# Patient Record
Sex: MALE | Race: WHITE | NOT HISPANIC OR LATINO | Employment: OTHER | URBAN - METROPOLITAN AREA
[De-identification: names, ages, dates, MRNs, and addresses within clinical notes are randomized per-mention and may not be internally consistent; named-entity substitution may affect disease eponyms.]

---

## 2019-06-20 ENCOUNTER — APPOINTMENT (OUTPATIENT)
Dept: NEUROLOGY | Facility: HOSPITAL | Age: 79
End: 2019-06-20

## 2019-06-20 ENCOUNTER — APPOINTMENT (OUTPATIENT)
Dept: CT IMAGING | Facility: HOSPITAL | Age: 79
End: 2019-06-20

## 2019-06-20 ENCOUNTER — APPOINTMENT (OUTPATIENT)
Dept: MRI IMAGING | Facility: HOSPITAL | Age: 79
End: 2019-06-20

## 2019-06-20 ENCOUNTER — HOSPITAL ENCOUNTER (OUTPATIENT)
Facility: HOSPITAL | Age: 79
Setting detail: OBSERVATION
Discharge: HOME | End: 2019-06-22
Attending: EMERGENCY MEDICINE | Admitting: HOSPITALIST

## 2019-06-20 DIAGNOSIS — R41.0 CONFUSION: Primary | ICD-10-CM

## 2019-06-20 PROBLEM — M19.90 ARTHRITIS: Status: ACTIVE | Noted: 2019-06-20

## 2019-06-20 PROBLEM — R41.3 AMNESTIC DISORDER: Status: ACTIVE | Noted: 2019-06-20

## 2019-06-20 PROBLEM — F01.50 VASCULAR DEMENTIA (HCC): Status: ACTIVE | Noted: 2019-06-20

## 2019-06-20 PROBLEM — F09 OBS (ORGANIC BRAIN SYNDROME): Status: ACTIVE | Noted: 2019-06-20

## 2019-06-20 LAB
ALBUMIN SERPL-MCNC: 4 G/DL (ref 3.5–5.2)
ALBUMIN/GLOB SERPL: 1.4 G/DL
ALP SERPL-CCNC: 57 U/L (ref 39–117)
ALT SERPL W P-5'-P-CCNC: 8 U/L (ref 1–41)
AMMONIA BLD-SCNC: 12 UMOL/L (ref 16–60)
AMPHET+METHAMPHET UR QL: NEGATIVE
ANION GAP SERPL CALCULATED.3IONS-SCNC: 10 MMOL/L
AST SERPL-CCNC: 15 U/L (ref 1–40)
BARBITURATES UR QL SCN: NEGATIVE
BASOPHILS # BLD AUTO: 0.08 10*3/MM3 (ref 0–0.2)
BASOPHILS NFR BLD AUTO: 1 % (ref 0–1.5)
BENZODIAZ UR QL SCN: NEGATIVE
BILIRUB SERPL-MCNC: 0.6 MG/DL (ref 0.2–1.2)
BILIRUB UR QL STRIP: NEGATIVE
BUN BLD-MCNC: 13 MG/DL (ref 8–23)
BUN/CREAT SERPL: 14.4 (ref 7–25)
CALCIUM SPEC-SCNC: 9.5 MG/DL (ref 8.6–10.5)
CANNABINOIDS SERPL QL: NEGATIVE
CHLORIDE SERPL-SCNC: 105 MMOL/L (ref 98–107)
CLARITY UR: CLEAR
CO2 SERPL-SCNC: 27 MMOL/L (ref 22–29)
COCAINE UR QL: NEGATIVE
COLOR UR: YELLOW
CREAT BLD-MCNC: 0.9 MG/DL (ref 0.76–1.27)
DEPRECATED RDW RBC AUTO: 46.3 FL (ref 37–54)
EOSINOPHIL # BLD AUTO: 0.57 10*3/MM3 (ref 0–0.4)
EOSINOPHIL NFR BLD AUTO: 6.9 % (ref 0.3–6.2)
ERYTHROCYTE [DISTWIDTH] IN BLOOD BY AUTOMATED COUNT: 14.3 % (ref 12.3–15.4)
ETHANOL BLD-MCNC: <10 MG/DL (ref 0–10)
ETHANOL UR QL: <0.01 %
FOLATE SERPL-MCNC: 13.9 NG/ML (ref 4.78–24.2)
GFR SERPL CREATININE-BSD FRML MDRD: 81 ML/MIN/1.73
GLOBULIN UR ELPH-MCNC: 2.8 GM/DL
GLUCOSE BLD-MCNC: 101 MG/DL (ref 65–99)
GLUCOSE UR STRIP-MCNC: NEGATIVE MG/DL
HCT VFR BLD AUTO: 43.7 % (ref 37.5–51)
HGB BLD-MCNC: 13.9 G/DL (ref 13–17.7)
HGB UR QL STRIP.AUTO: NEGATIVE
IMM GRANULOCYTES # BLD AUTO: 0.04 10*3/MM3 (ref 0–0.05)
IMM GRANULOCYTES NFR BLD AUTO: 0.5 % (ref 0–0.5)
KETONES UR QL STRIP: NEGATIVE
LEUKOCYTE ESTERASE UR QL STRIP.AUTO: NEGATIVE
LYMPHOCYTES # BLD AUTO: 1.54 10*3/MM3 (ref 0.7–3.1)
LYMPHOCYTES NFR BLD AUTO: 18.8 % (ref 19.6–45.3)
MCH RBC QN AUTO: 28.4 PG (ref 26.6–33)
MCHC RBC AUTO-ENTMCNC: 31.8 G/DL (ref 31.5–35.7)
MCV RBC AUTO: 89.2 FL (ref 79–97)
METHADONE UR QL SCN: NEGATIVE
MONOCYTES # BLD AUTO: 0.8 10*3/MM3 (ref 0.1–0.9)
MONOCYTES NFR BLD AUTO: 9.7 % (ref 5–12)
NEUTROPHILS # BLD AUTO: 5.18 10*3/MM3 (ref 1.7–7)
NEUTROPHILS NFR BLD AUTO: 63.1 % (ref 42.7–76)
NITRITE UR QL STRIP: NEGATIVE
NRBC BLD AUTO-RTO: 0 /100 WBC (ref 0–0.2)
OPIATES UR QL: NEGATIVE
OXYCODONE UR QL SCN: NEGATIVE
PH UR STRIP.AUTO: 6 [PH] (ref 5–8)
PLATELET # BLD AUTO: 261 10*3/MM3 (ref 140–450)
PMV BLD AUTO: 9.8 FL (ref 6–12)
POTASSIUM BLD-SCNC: 3.7 MMOL/L (ref 3.5–5.2)
PROT SERPL-MCNC: 6.8 G/DL (ref 6–8.5)
PROT UR QL STRIP: NEGATIVE
RBC # BLD AUTO: 4.9 10*6/MM3 (ref 4.14–5.8)
SODIUM BLD-SCNC: 142 MMOL/L (ref 136–145)
SP GR UR STRIP: 1.02 (ref 1–1.03)
TSH SERPL DL<=0.05 MIU/L-ACNC: 1.09 MIU/ML (ref 0.27–4.2)
UROBILINOGEN UR QL STRIP: NORMAL
VIT B12 BLD-MCNC: 295 PG/ML (ref 211–946)
WBC NRBC COR # BLD: 8.21 10*3/MM3 (ref 3.4–10.8)

## 2019-06-20 PROCEDURE — 80307 DRUG TEST PRSMV CHEM ANLYZR: CPT | Performed by: PHYSICIAN ASSISTANT

## 2019-06-20 PROCEDURE — 70450 CT HEAD/BRAIN W/O DYE: CPT

## 2019-06-20 PROCEDURE — G0378 HOSPITAL OBSERVATION PER HR: HCPCS

## 2019-06-20 PROCEDURE — 70553 MRI BRAIN STEM W/O & W/DYE: CPT

## 2019-06-20 PROCEDURE — A9577 INJ MULTIHANCE: HCPCS | Performed by: HOSPITALIST

## 2019-06-20 PROCEDURE — 82746 ASSAY OF FOLIC ACID SERUM: CPT | Performed by: NURSE PRACTITIONER

## 2019-06-20 PROCEDURE — 95816 EEG AWAKE AND DROWSY: CPT | Performed by: PSYCHIATRY & NEUROLOGY

## 2019-06-20 PROCEDURE — 80307 DRUG TEST PRSMV CHEM ANLYZR: CPT | Performed by: INTERNAL MEDICINE

## 2019-06-20 PROCEDURE — 99285 EMERGENCY DEPT VISIT HI MDM: CPT

## 2019-06-20 PROCEDURE — 36415 COLL VENOUS BLD VENIPUNCTURE: CPT | Performed by: INTERNAL MEDICINE

## 2019-06-20 PROCEDURE — 82607 VITAMIN B-12: CPT | Performed by: NURSE PRACTITIONER

## 2019-06-20 PROCEDURE — 81003 URINALYSIS AUTO W/O SCOPE: CPT | Performed by: PHYSICIAN ASSISTANT

## 2019-06-20 PROCEDURE — 84443 ASSAY THYROID STIM HORMONE: CPT | Performed by: NURSE PRACTITIONER

## 2019-06-20 PROCEDURE — 0 GADOBENATE DIMEGLUMINE 529 MG/ML SOLUTION: Performed by: HOSPITALIST

## 2019-06-20 PROCEDURE — 80053 COMPREHEN METABOLIC PANEL: CPT | Performed by: PHYSICIAN ASSISTANT

## 2019-06-20 PROCEDURE — 85025 COMPLETE CBC W/AUTO DIFF WBC: CPT | Performed by: PHYSICIAN ASSISTANT

## 2019-06-20 PROCEDURE — 95816 EEG AWAKE AND DROWSY: CPT

## 2019-06-20 PROCEDURE — 97161 PT EVAL LOW COMPLEX 20 MIN: CPT

## 2019-06-20 PROCEDURE — 99204 OFFICE O/P NEW MOD 45 MIN: CPT | Performed by: PSYCHIATRY & NEUROLOGY

## 2019-06-20 PROCEDURE — 82140 ASSAY OF AMMONIA: CPT | Performed by: NURSE PRACTITIONER

## 2019-06-20 RX ORDER — NITROGLYCERIN 0.4 MG/1
0.4 TABLET SUBLINGUAL
COMMUNITY
End: 2019-06-22 | Stop reason: HOSPADM

## 2019-06-20 RX ORDER — ONDANSETRON 4 MG/1
4 TABLET, FILM COATED ORAL EVERY 6 HOURS PRN
Status: DISCONTINUED | OUTPATIENT
Start: 2019-06-20 | End: 2019-06-22 | Stop reason: HOSPADM

## 2019-06-20 RX ORDER — ONDANSETRON 2 MG/ML
4 INJECTION INTRAMUSCULAR; INTRAVENOUS EVERY 6 HOURS PRN
Status: DISCONTINUED | OUTPATIENT
Start: 2019-06-20 | End: 2019-06-22 | Stop reason: HOSPADM

## 2019-06-20 RX ORDER — MELOXICAM 7.5 MG/1
7.5 TABLET ORAL DAILY
COMMUNITY
End: 2019-06-22 | Stop reason: HOSPADM

## 2019-06-20 RX ORDER — SODIUM CHLORIDE 0.9 % (FLUSH) 0.9 %
3 SYRINGE (ML) INJECTION EVERY 12 HOURS SCHEDULED
Status: DISCONTINUED | OUTPATIENT
Start: 2019-06-20 | End: 2019-06-22 | Stop reason: HOSPADM

## 2019-06-20 RX ORDER — SODIUM CHLORIDE 0.9 % (FLUSH) 0.9 %
1-10 SYRINGE (ML) INJECTION AS NEEDED
Status: DISCONTINUED | OUTPATIENT
Start: 2019-06-20 | End: 2019-06-22 | Stop reason: HOSPADM

## 2019-06-20 RX ORDER — ACETAMINOPHEN 650 MG/1
650 SUPPOSITORY RECTAL EVERY 4 HOURS PRN
Status: DISCONTINUED | OUTPATIENT
Start: 2019-06-20 | End: 2019-06-22 | Stop reason: HOSPADM

## 2019-06-20 RX ADMIN — SODIUM CHLORIDE, PRESERVATIVE FREE 3 ML: 5 INJECTION INTRAVENOUS at 20:54

## 2019-06-20 RX ADMIN — SODIUM CHLORIDE, PRESERVATIVE FREE 3 ML: 5 INJECTION INTRAVENOUS at 09:05

## 2019-06-20 RX ADMIN — GADOBENATE DIMEGLUMINE 15 ML: 529 INJECTION, SOLUTION INTRAVENOUS at 21:00

## 2019-06-20 RX ADMIN — SODIUM CHLORIDE 500 ML: 9 INJECTION, SOLUTION INTRAVENOUS at 03:36

## 2019-06-20 NOTE — PLAN OF CARE
Problem: Patient Care Overview  Goal: Plan of Care Review  Outcome: Ongoing (interventions implemented as appropriate)   06/20/19 9668   OTHER   Outcome Summary Pt with confusion and tangetal conversation. However, strength and balance appear to be near baseline as pt did not require assistance with bed mobility, transfers, or gait. PT will sign off at this time. RN aware. Please reconsult PT, if patient should experience a change in mobility.   Coping/Psychosocial   Plan of Care Reviewed With patient

## 2019-06-20 NOTE — NURSING NOTE
Called PCP and they are faxing the last couple of office noted.  reports that he was pretty with it at his last appointment on June 12th.

## 2019-06-20 NOTE — ED PROVIDER NOTES
Pt presents to the ED via EMS for evaluation of AMS. Pt was pulled over by PD tonight, and told police that he is from [place] and was driving to [place]. Pt reported that he left home on Monday and has been driving for 18 hours, and seemed confused, prompting PD to call EMS. On exam, Pt is resting comfortably, in no distress. Vital signs are stable. Plan for admission for further evaluation of AMS    Attestation:  The EVA and I have discussed this patient's history, physical exam, and treatment plan.  I have reviewed the documentation and personally had a face to face interaction with the patient. I affirm the documentation and agree with the treatment and plan.  The attached note describes my personal findings.      Documentation assistance provided by praveen Tellez for Dr Rivera Information recorded by the praveen was done at my direction and has been verified and validated by me.     Mady Tellez  06/20/19 0459       Juan Rivera MD  06/20/19 0535       Juan Rivera MD  06/20/19 0636

## 2019-06-20 NOTE — ED PROVIDER NOTES
" EMERGENCY DEPARTMENT ENCOUNTER    CHIEF COMPLAINT  Chief Complaint: Altered Mental Status  History given by: patient and EMS  History limited by: AMS  Room Number: 26/26  PMD: System, Provider Not In      HPI:  Pt is a 78 y.o. male who presents with AMS that was noticed tonight by EMS after he was pulled over by PD. When the PD found the pt, his car was parked in the middle of the road. Upon questioning the pt, they noted that the pt was markedly confused. Pt reports that he is initially from [place] and [place] [facility] [place] about [facility] days ago. He has driven \"about 18 hours\" all together. In his pockets he has receipts for multiple gas stations and a hotel key. Pt believes he is [place] at this time. He is currently unsure of why he is in the ED and denies any complaints.    PAST MEDICAL HISTORY  Active Ambulatory Problems     Diagnosis Date Noted   • No Active Ambulatory Problems     Resolved Ambulatory Problems     Diagnosis Date Noted   • No Resolved Ambulatory Problems     No Additional Past Medical History       PAST SURGICAL HISTORY  No past surgical history on file.    FAMILY HISTORY  No family history on file.    SOCIAL HISTORY  Social History     Socioeconomic History   • Marital status: Unknown     Spouse name: Not on file   • Number of children: Not on file   • Years of education: Not on file   • Highest education level: Not on file       ALLERGIES  Patient has no known allergies.    REVIEW OF SYSTEMS  Review of Systems   Unable to perform ROS: Mental status change       PHYSICAL EXAM  ED Triage Vitals [06/20/19 0229]   Temp Heart Rate Resp BP SpO2   -- 71 18 135/66 97 %      Temp src Heart Rate Source Patient Position BP Location FiO2 (%)   -- -- -- -- --       Physical Exam   Constitutional: No distress.   HENT:   Head: Normocephalic and atraumatic.   Eyes: EOM are normal. Pupils are equal, round, and reactive to light.   Neck: Normal range of motion. Neck supple.   Cardiovascular: Normal " rate, regular rhythm and normal heart sounds.   Pulmonary/Chest: Effort normal and breath sounds normal. No respiratory distress.   Abdominal: Soft. There is no tenderness. There is no rebound and no guarding.   Musculoskeletal: Normal range of motion. He exhibits no edema.   Neurological: He is alert. He has normal sensation and normal strength.   No focal neuro deficits.    Skin: Skin is warm and dry.   Nursing note and vitals reviewed.      LAB RESULTS  Lab Results (last 24 hours)     Procedure Component Value Units Date/Time    CBC & Differential [[id number]] Collected:  06/20/19 0333    Specimen:  Blood Updated:  06/20/19 0350    Narrative:       The following orders were created for panel order CBC & Differential.  Procedure                               Abnormality         Status                     ---------                               -----------         ------                     CBC Auto Differential[[id number]]        Abnormal            Final result                 Please view results for these tests on the individual orders.    Comprehensive Metabolic Panel [[id number]]  (Abnormal) Collected:  06/20/19 0333    Specimen:  Blood Updated:  06/20/19 0409     Glucose 101 mg/dL      BUN 13 mg/dL      Creatinine 0.90 mg/dL      Sodium 142 mmol/L      Potassium 3.7 mmol/L      Chloride 105 mmol/L      CO2 27.0 mmol/L      Calcium 9.5 mg/dL      Total Protein 6.8 g/dL      Albumin 4.00 g/dL      ALT (SGPT) 8 U/L      AST (SGOT) 15 U/L      Alkaline Phosphatase 57 U/L      Total Bilirubin 0.6 mg/dL      eGFR Non African Amer 81 mL/min/1.73      Globulin 2.8 gm/dL      A/G Ratio 1.4 g/dL      BUN/Creatinine Ratio 14.4     Anion Gap 10.0 mmol/L     Narrative:       GFR Normal >60  Chronic Kidney Disease <60  Kidney Failure <15    CBC Auto Differential [[id number]]  (Abnormal) Collected:  06/20/19 0333    Specimen:  Blood Updated:  06/20/19 0350     WBC 8.21 10*3/mm3      RBC 4.90 10*6/mm3      Hemoglobin 13.9 g/dL       Hematocrit 43.7 %      MCV 89.2 fL      MCH 28.4 pg      MCHC 31.8 g/dL      RDW 14.3 %      RDW-SD 46.3 fl      MPV 9.8 fL      Platelets 261 10*3/mm3      Neutrophil % 63.1 %      Lymphocyte % 18.8 %      Monocyte % 9.7 %      Eosinophil % 6.9 %      Basophil % 1.0 %      Immature Grans % 0.5 %      Neutrophils, Absolute 5.18 10*3/mm3      Lymphocytes, Absolute 1.54 10*3/mm3      Monocytes, Absolute 0.80 10*3/mm3      Eosinophils, Absolute 0.57 10*3/mm3      Basophils, Absolute 0.08 10*3/mm3      Immature Grans, Absolute 0.04 10*3/mm3      nRBC 0.0 /100 WBC     Urinalysis With Microscopic If Indicated (No Culture) - Urine, Clean Catch [[id number]]  (Normal) Collected:  06/20/19 0416    Specimen:  Urine, Clean Catch Updated:  06/20/19 0427     Color, UA Yellow     Appearance, UA Clear     pH, UA 6.0     Specific Gravity, UA 1.020     Glucose, UA Negative     Ketones, UA Negative     Bilirubin, UA Negative     Blood, UA Negative     Protein, UA Negative     Leuk Esterase, UA Negative     Nitrite, UA Negative     Urobilinogen, UA 1.0 E.U./dL    Narrative:       Urine microscopic not indicated.          I ordered the above labs and reviewed the results    RADIOLOGY  CT Head Without Contrast   Final Result       1. No acute intracranial process identified.   2. Extensive sinus inflammatory changes.       Radiation dose reduction techniques were utilized, including automated   exposure control and exposure modulation based on body size.       This report was finalized on 6/20/2019 3:37 AM by Dr. Marium Haq M.D.               I ordered the above noted radiological studies. Interpreted by radiologist.  Reviewed by me in PACS.       PROCEDURES  Procedures        PROGRESS AND CONSULTS     0255- Ordered IVF, CT head, and labs.     0459- Discussed pt with Dr. Rivera, who, after a beside evaluation of the pt, agrees with the course of care.     0505- Placed call to A.     0514- Discussed pt with Michell Marcos  CAITY for Dr. Garcia ([facility]) who agrees to admit the pt.     MEDICAL DECISION MAKING  Results were reviewed/discussed with the patient and they were also made aware of online access. Pt also made aware that some labs, such as cultures, will not be resulted during ER visit and follow up with PMD is necessary.     MDM  Number of Diagnoses or Management Options  Confusion:      Amount and/or Complexity of Data Reviewed  Clinical lab tests: ordered and reviewed (Unremarkable)  Tests in the radiology section of CPT®: ordered and reviewed (CT head- negative acute)  Decide to obtain previous medical records or to obtain history from someone other than the patient: yes  Obtain history from someone other than the patient: yes (EMS)  Review and summarize past medical records: yes (No prior records available in EPIC)  Discuss the patient with other providers: yes (Dr. Rivera (Emergency Medicine)  CAITY Mandujano for Dr. Garcia ([facility]))  Independent visualization of images, tracings, or specimens: yes    Patient Progress  Patient progress: stable         DIAGNOSIS  Final diagnoses:   Confusion       DISPOSITION  ADMISSION    Discussed treatment plan and reason for admission with pt/family and admitting physician.  Pt/family voiced understanding of the plan for admission for further testing/treatment as needed.     Latest Documented Vital Signs:  As of 5:39 AM  BP- 121/77 HR- 61 Temp- 98.5 °F (36.9 °C) (Oral) O2 sat- 93%    --  Documentation assistance provided by praveen Vila for Walter Bailey PA-C.  Information recorded by the scribalejandro was done at my direction and has been verified and validated by me.       Aroldo Vila  06/20/19 0518       Walter Bailey PA  06/20/19 0539

## 2019-06-20 NOTE — PROGRESS NOTES
"Discharge Planning Assessment  [facility]     Patient Name: Juan Ramon Preciado  MRN: [id number]  Today's Date: 6/20/2019    Admit Date: 6/20/2019    Discharge Needs Assessment     Row Name 06/20/19 1237       Living Environment    Lives With  alone    Current Living Arrangements  home/apartment/condo    Primary Care Provided by  self    Provides Primary Care For  no one    Family Caregiver if Needed  sibling(s)    Family Caregiver Names  Brother: Ronaldo Preciado    Quality of Family Relationships  supportive       Transition Planning    Patient/Family Anticipates Transition to  other (see comments) Brother's home    Transportation Anticipated  family or friend will provide       Discharge Needs Assessment    Equipment Currently Used at Home  none    Offered/Gave Vendor List  no        Discharge Plan     Row Name 06/20/19 1239       Plan    Plan  Undecided    Patient/Family in Agreement with Plan  yes    Plan Comments  Introduced self and role of CCP. Facsheet verified. Patient stated he uses \"[facility] on [address] in [place]\" as his pharmacy. Patient states he goes to a \"clinic\" and does not have a PCP. Patient states he lives alone in a one story house. There are 2 steps to enter the home. Prior to admission, patient was independent with ADL's. Patient stated he was driving to [place] to vist his brother and cousin whom have both lost their spouses and they were going to discuss possibilities of the three of them living closer together. Patient stated he left Sunday afternoon. Called and spoke with brother Ronaldo Preciado [phone] who confirmed that patient was driving to visit him and that he left on Sunday afternoon. Brother stated he talked with patient either on Monday or Tuesday and at that time patient was in [place]. Brother stated that patient has been independent as far as he knew.Patient has never been  or has any children.  Brother stated that [facility] in [place] called him to say the patient " "has left \"lots\" of luggage in room. Brother unsure why patient had so much luggage. Brother did confirm that they were going to talk about living closer or together but no plans were made.  Discussed plan for discharge and brother stated he would have to get his son Juan to bring him to [place] so he could bring patient back to [place] with him. Requesting patient not to be discharged until weekend so he could get here. Brother did stated he and his son Juan are the patient's POA.  Will F/U               Demographic Summary     Row Name 06/20/19 1231       General Information    Admission Type  observation    Reason for Consult  discharge planning    Preferred Language  English     Used During This Interaction  no       Contact Information    Permission Granted to Share Info With  family/designee        Functional Status     Row Name 06/20/19 1232       Functional Status    Usual Activity Tolerance  good    Current Activity Tolerance  moderate       Functional Status, IADL    Medications  independent    Meal Preparation  independent    Housekeeping  independent    Laundry  independent    Shopping  independent       Mental Status    General Appearance WDL  WDL        Psychosocial     Row Name 06/20/19 1232       Values/Beliefs    Spiritual, Cultural Beliefs, Buddhism Practices, Values that Affect Care  no       Speech WDL    Speech  rambling       Thought Process WDL    Thought Process WDL  thought content;thought process;judgment and insight    Judgment and Insight  insight not appropriate to situation    Thought Process  disorganized       Intellectual Performance WDL    Intellectual Performance  disoriented to place;disoriented to situation;disoriented to time;forgetfulness;impaired concentration;unable to understand explanation/reasoning    Level of Consciousness  Confusion       Coping/Stress    Sources of Support  sibling(s)    Understanding of Condition and Treatment  partial understanding of " medical condition       Developmental Stage (Eriksson's)    Developmental Stage  Stage 8 (65 years-death/Late Adulthood) Integrity vs. Despair        Abuse/Neglect     Row Name 06/20/19 1237       Personal Safety    Feels Unsafe at Home or Work/School  no    Feels Threatened by Someone  no    Does Anyone Try to Keep You From Having Contact with Others or Doing Things Outside Your Home?  no    Physical Signs of Abuse Present  no          Conchita Kapoor, RN

## 2019-06-20 NOTE — NURSING NOTE
Patient was at the [facility] in [place], [place] on 6/17/19 - 6/18/19. Verified that his belongings are there. RN gave unit number and Owner will call after 10am when he arrives in his office. His name is Alvin Alvarez.     [facility] [phone]

## 2019-06-20 NOTE — ED NOTES
Spoke to patients brother and the officer who was on scene.  Pt's brother is Ronaldo rPeciado [phone].  He stated he did not know any medical history of his brother.  States he has not seen him in at least a couple years although he has talked to him on the phone.  The patient does live in [place]. He was making a planned trip to visit his brother outside of [facility]. Per brother, pt left [place] at noon on Sunday.  The brother last spoke to the patient on Monday and the pt was in [place].  Pt had told him that [place].  The brother stated he sounded normal to him.    Per brother, pt has a friend in [place] that he sometimes has breakfast with and she may be a retired nurse.  Number was found for a neighbor Mary May [phone].  Brother stated he believes this is his friend. Brother stated he is older than patient and is unable to drive to [place].     Per officer, pt was found in [facility] in his vehicle.  His vehicle was parked in the middle of the road.  No obvious damage.  No signs of an accident.  Pt had no complaints.  Pt was confused for police and they were concerned, so they called EMS to evaluate.      While looking through pt's belongings, a hotel door key was found.  Pt states the hotel is in [place], but unable to state where in [place].  The patient states he needs to find it because all his belongings are there.  Officer did confirm that no belonging such as suitcases or clothes for a trip were found in the pt's car.     Receipts were also found with the pt for [place], [place], [place], [place], [place], [place], and [place], [place].      Spoke with Officer Emil Salmon with [facility].  [facility] dispatch number [phone].    Pt's car in at impound lot in [facility] at [address].      Griselda Traore RN  06/20/19 2077

## 2019-06-20 NOTE — PLAN OF CARE
Problem: Fall Risk (Adult)  Goal: Identify Related Risk Factors and Signs and Symptoms  Outcome: Ongoing (interventions implemented as appropriate)   06/20/19 1739   Fall Risk (Adult)   Related Risk Factors (Fall Risk) confusion/agitation;environment unfamiliar   Signs and Symptoms (Fall Risk) presence of risk factors     Goal: Absence of Fall  Outcome: Ongoing (interventions implemented as appropriate)   06/20/19 1739   Fall Risk (Adult)   Absence of Fall making progress toward outcome       Problem: Skin Injury Risk (Adult)  Goal: Identify Related Risk Factors and Signs and Symptoms  Outcome: Ongoing (interventions implemented as appropriate)   06/20/19 1739   Skin Injury Risk (Adult)   Related Risk Factors (Skin Injury Risk) cognitive impairment;fluid intake inadequate     Goal: Skin Health and Integrity  Outcome: Ongoing (interventions implemented as appropriate)   06/20/19 1739   Skin Injury Risk (Adult)   Skin Health and Integrity making progress toward outcome       Problem: Confusion, Acute (Adult)  Goal: Identify Related Risk Factors and Signs and Symptoms  Outcome: Ongoing (interventions implemented as appropriate)   06/20/19 1739   Confusion, Acute (Adult)   Related Risk Factors (Acute Confusion) cognitive impairment;dehydration   Signs and Symptoms (Acute Confusion) memory disturbed     Goal: Cognitive/Functional Impairments Minimized  Outcome: Ongoing (interventions implemented as appropriate)   06/20/19 1739   Confusion, Acute (Adult)   Cognitive/Functional Impairments Minimized making progress toward outcome       Problem: Patient Care Overview  Goal: Plan of Care Review  Outcome: Ongoing (interventions implemented as appropriate)   06/20/19 1739   OTHER   Outcome Summary LOCX2. vital signs stable. no C/O nausea or vomiting. PT's brother & nephew contacted made aware of situation & record updated emergency numbers & POA paperwork received put in chart. EEG completed this shift, awaiting completion of  MRI. will continue to monitor.    Coping/Psychosocial   Plan of Care Reviewed With patient   Plan of Care Review   Progress no change

## 2019-06-20 NOTE — ED NOTES
Resting quietly.  No acute distress noted.  Appears to be sleeping at times.      Griselda Traore RN  06/20/19 0611

## 2019-06-20 NOTE — CONSULTS
"  Patient Identification:  NAME:  Juan Ramon Preciado  Age:  78 y.o.   Sex:  male   :  1940   MRN:  [id number]       Chief complaint: He does not have one, reason for consult confusion and disorientation    History of present illness: This patient is 78-year-old right-handed white male with a history of arthritis hiatal hernia some hypertension who apparently has been found confused in a car on the road.  He has been brought in.  As best we can put this together he has been in [place] within the last couple of days and apparently drove here to [place] context the patient his memory according to notes has not been as good recently according to family members or others, per notes in the chart.  No other history is available he is amnestic for the event but then downplays everything he apparently does have as context some dementia quality was some confused episode of episode apparently worse than he is confused in general as he was driving locations not pertinent duration not known he still confused at this time but apparently not as confused as he was.  \"If I just stopped and took a nap none of this would have happened\" CT of the head by my independent eyeball review is normal      Past medical history:  Past Medical History:   Diagnosis Date   • Arthritis    • Confusion and disorientation 2019   • Hiatal hernia 2001       Past surgical history:  History reviewed. No pertinent surgical history.    Allergies:  Patient has no known allergies.    Home medications:  Medications Prior to Admission   Medication Sig Dispense Refill Last Dose   • meloxicam (MOBIC) 7.5 MG tablet Take 7.5 mg by mouth Daily.   Unknown at Unknown time   • nitroglycerin (NITROSTAT) 0.4 MG SL tablet Place 0.4 mg under the tongue Every 5 (Five) Minutes As Needed for Chest Pain. Take no more than 3 doses in 15 minutes.   Unknown at Unknown time        Hospital medications:    sodium chloride 3 mL Intravenous Q12H        •  " acetaminophen  •  ondansetron **OR** ondansetron  •  sodium chloride    Family history:  Family History   Problem Relation Age of Onset   • Heart disease Mother    • Cancer Father    • Heart disease Sister        Social history:  Social History     Tobacco Use   • Smoking status: Former Smoker     Years: 30.00     Types: Cigars   • Smokeless tobacco: Never Used   Substance Use Topics   • Alcohol use: No     Frequency: Never   • Drug use: No       Review of systems:    Completely untrustworthy he denies any pain memory problems or confusion.  Family not present I reviewed the chart fully    Objective:  Vitals Ranges:   Temp:  [97.7 °F (36.5 °C)-98.5 °F (36.9 °C)] 97.9 °F (36.6 °C)  Heart Rate:  [56-75] 75  Resp:  [17-18] 18  BP: (116-145)/(66-88) 126/80      Physical Exam:  Awake alert oriented x1 only he does know where in [place] after waiting a while.  He did not know the name of the hospital.  Fund of knowledge poor attention span concentration good recent remote memory poor language function normal well-developed well-nourished no distress pupils one half constricting to 1 disc retinas could not be visualized he was able to count fingers at 3 feet visual fields could not be checked extraocular movements full horizontally face palate and tongue are symmetrical no other cranial nerves what he follow motor not the easiest exam he is apraxic and talks a lot during the exam probably is 5- out of 5 x4 no atrophy fasciculations rigidity or bradykinesia reflexes absent throughout toes downgoing bilaterally sensation coordination Station and gait he could not follow any of this heart regular without murmur neck supple without bruits extremities no clubbing cyanosis edema visual acuity normal at 3 feet    Results review:   I reviewed the patient's new clinical results.    Data review:  Lab Results (last 24 hours)     Procedure Component Value Units Date/Time    Folate [[id number]]  (Normal) Collected:  06/20/19 0984     Specimen:  Blood Updated:  06/20/19 1324     Folate 13.90 ng/mL     Vitamin B12 [[id number]]  (Normal) Collected:  06/20/19 0937    Specimen:  Blood Updated:  06/20/19 1324     Vitamin B-12 295 pg/mL     Ammonia [[id number]]  (Abnormal) Collected:  06/20/19 0937    Specimen:  Blood Updated:  06/20/19 1027     Ammonia 12 umol/L     TSH [[id number]]  (Normal) Collected:  06/20/19 0801    Specimen:  Blood Updated:  06/20/19 0950     TSH 1.090 mIU/mL     Ethanol [[id number]] Collected:  06/20/19 0801    Specimen:  Blood Updated:  06/20/19 0848     Ethanol <10 mg/dL      Ethanol % <0.010 %     Urine Drug Screen - Urine, Clean Catch [[id number]]  (Normal) Collected:  06/20/19 0416    Specimen:  Urine, Clean Catch Updated:  06/20/19 0624     Amphet/Methamphet, Screen Negative     Barbiturates Screen, Urine Negative     Benzodiazepine Screen, Urine Negative     Cocaine Screen, Urine Negative     Opiate Screen Negative     THC, Screen, Urine Negative     Methadone Screen, Urine Negative     Oxycodone Screen, Urine Negative    Narrative:       Negative Thresholds For Drugs Screened:     Amphetamines               500 ng/ml   Barbiturates               200 ng/ml   Benzodiazepines            100 ng/ml   Cocaine                    300 ng/ml   Methadone                  300 ng/ml   Opiates                    300 ng/ml   Oxycodone                  100 ng/ml   THC                        50 ng/ml    The Normal Value for all drugs tested is negative. This report includes final unconfirmed screening results to be used for medical treatment purposes only. Unconfirmed results must not be used for non-medical purposes such as employment or legal testing. Clinical consideration should be applied to any drug of abuse test, particulary when unconfirmed results are used.    Urinalysis With Microscopic If Indicated (No Culture) - Urine, Clean Catch [[id number]]  (Normal) Collected:  06/20/19 0416    Specimen:  Urine, Clean Catch Updated:  06/20/19  0427     Color, UA Yellow     Appearance, UA Clear     pH, UA 6.0     Specific Gravity, UA 1.020     Glucose, UA Negative     Ketones, UA Negative     Bilirubin, UA Negative     Blood, UA Negative     Protein, UA Negative     Leuk Esterase, UA Negative     Nitrite, UA Negative     Urobilinogen, UA 1.0 E.U./dL    Narrative:       Urine microscopic not indicated.    Comprehensive Metabolic Panel [[id number]]  (Abnormal) Collected:  06/20/19 0333    Specimen:  Blood Updated:  06/20/19 0409     Glucose 101 mg/dL      BUN 13 mg/dL      Creatinine 0.90 mg/dL      Sodium 142 mmol/L      Potassium 3.7 mmol/L      Chloride 105 mmol/L      CO2 27.0 mmol/L      Calcium 9.5 mg/dL      Total Protein 6.8 g/dL      Albumin 4.00 g/dL      ALT (SGPT) 8 U/L      AST (SGOT) 15 U/L      Alkaline Phosphatase 57 U/L      Total Bilirubin 0.6 mg/dL      eGFR Non African Amer 81 mL/min/1.73      Globulin 2.8 gm/dL      A/G Ratio 1.4 g/dL      BUN/Creatinine Ratio 14.4     Anion Gap 10.0 mmol/L     Narrative:       GFR Normal >60  Chronic Kidney Disease <60  Kidney Failure <15    CBC & Differential [[id number]] Collected:  06/20/19 0333    Specimen:  Blood Updated:  06/20/19 0350    Narrative:       The following orders were created for panel order CBC & Differential.  Procedure                               Abnormality         Status                     ---------                               -----------         ------                     CBC Auto Differential[[id number]]        Abnormal            Final result                 Please view results for these tests on the individual orders.    CBC Auto Differential [[id number]]  (Abnormal) Collected:  06/20/19 0333    Specimen:  Blood Updated:  06/20/19 0350     WBC 8.21 10*3/mm3      RBC 4.90 10*6/mm3      Hemoglobin 13.9 g/dL      Hematocrit 43.7 %      MCV 89.2 fL      MCH 28.4 pg      MCHC 31.8 g/dL      RDW 14.3 %      RDW-SD 46.3 fl      MPV 9.8 fL      Platelets 261 10*3/mm3      Neutrophil  % 63.1 %      Lymphocyte % 18.8 %      Monocyte % 9.7 %      Eosinophil % 6.9 %      Basophil % 1.0 %      Immature Grans % 0.5 %      Neutrophils, Absolute 5.18 10*3/mm3      Lymphocytes, Absolute 1.54 10*3/mm3      Monocytes, Absolute 0.80 10*3/mm3      Eosinophils, Absolute 0.57 10*3/mm3      Basophils, Absolute 0.08 10*3/mm3      Immature Grans, Absolute 0.04 10*3/mm3      nRBC 0.0 /100 WBC            Imaging:  Imaging Results (last 24 hours)     Procedure Component Value Units Date/Time    CT Head Without Contrast [[id number]] Collected:  06/20/19 0319     Updated:  06/20/19 0341    Narrative:       CT OF THE HEAD WITHOUT CONTRAST     HISTORY: Altered mental status     COMPARISON: None available.     TECHNIQUE: Axial CT imaging was obtained from the vertex of the skull  and skull base. No IV contrast was administered.     FINDINGS:  No acute intracranial hemorrhage is identified. There is diffuse  atrophy. There is periventricular deep white matter microangiopathic  disease. There is no midline shift or mass effect. Mucosal thickening is  identified within the ethmoid sinuses. There is near complete  opacification of the right maxillary sinus. There are calcifications in  hyperattenuation material identified within this debris, suggesting that  it is chronic. Mild mucosal thickening seen within the left maxillary  sinus. Mucous retention cyst is seen within the right sphenoid sinus.  Right mastoid air cells also appear relatively underpneumatized, which  may reflect changes of chronic mastoiditis/otitis media.       Impression:          1. No acute intracranial process identified.  2. Extensive sinus inflammatory changes.     Radiation dose reduction techniques were utilized, including automated  exposure control and exposure modulation based on body size.     This report was finalized on 6/20/2019 3:37 AM by Dr. Marium Haq M.D.                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  He is very cute but tends to downplay everything.  Clearly he had some event for which he is amnestic it is just hard to tell how he amnestic he is on a day-to-day basis anyway.  For now we will check an MRI of the brain with and without contrast and an EEG.  Simply put, it is going to be hard to make any more specific diagnosis other than episodic confusion on top of what appears to be dementia.      Walter Osorio MD  06/20/19  3:07 PM

## 2019-06-20 NOTE — ED NOTES
Pt resting quietly.  Arouses easily to voice.  No acute distress noted.      Griselda Traore RN  06/20/19 0501

## 2019-06-20 NOTE — H&P
"    Patient Name:  Juan Ramon Preciado  YOB: 1940  MRN:  [id number]  Admit Date:  6/20/2019  Patient Care Team:  System, Provider Not In as PCP - General      Subjective   History Present Illness     Chief Complaint   Patient presents with   • Altered Mental Status       Mr. Preciado is a 78 y.o. with a history of arthritis, hemorrhoids, hiatal hernia but no other known history. He presented to [facility] need by [place] police who picked him up when they found him in his car parked in the middle of the road.  The patient is oriented to person only and cannot provide any history.  He is aware that he was traveling from [place] to visit his brother in [facility] but cannot recount any details of his trip in the last few days.  Nursing staff has spoken with brother and have been able to ascertain that he left [place] on Sunday to visit his brother.  They did speak on Monday but since that time his brother is not heard from him.  Based off of receipts in his wallet he stayed at a hotel in [place], [place] on 6/17, yesterday evening he was in [facility] at 6 PM, at 9 PM he was in the [facility] and then he was found by police in [place] around midnight.  According to police, there was no luggage in his car or evidence that he been traveling for several days. When asked where his luggage is he said \"maybe I left it with a woman.\" The nurse was able to speak with a neighbor who states she is noticed that he has had some functional decline and increased confusion in the last few weeks.  He denies alcohol use, drug use, or tobacco use.  He was seen by his primary care provider on 6/12/19 for mild depression but no other significant documentation relating to confusion.   Patient has no complaints and is pleasant.  He states he does not take any medications. CT head and lab work in the ED all within normal limits     History of Present Illness  Review of " Systems   Constitutional: Negative for appetite change, chills, fatigue, fever and unexpected weight change.   HENT: Negative for congestion and trouble swallowing.    Eyes: Negative for visual disturbance.   Respiratory: Negative for cough, choking and shortness of breath.    Cardiovascular: Negative for chest pain.   Gastrointestinal: Negative for abdominal distention, abdominal pain, blood in stool, constipation, diarrhea, nausea and vomiting.   Genitourinary: Negative for difficulty urinating and dysuria.   Musculoskeletal: Negative for back pain, neck pain and neck stiffness.   Skin: Negative for pallor, rash and wound.   Allergic/Immunologic: Negative for immunocompromised state.   Neurological: Negative for dizziness.   Psychiatric/Behavioral: Positive for confusion. Negative for agitation, hallucinations and suicidal ideas. The patient is not nervous/anxious.         Personal History     Past Medical History:   Diagnosis Date   • Arthritis    • Confusion and disorientation 06/20/2019   • Hiatal hernia 01/2001     History reviewed. No pertinent surgical history.  Family History   Problem Relation Age of Onset   • Heart disease Mother    • Cancer Father    • Heart disease Sister      Social History     Tobacco Use   • Smoking status: Former Smoker     Years: 30.00     Types: Cigars   • Smokeless tobacco: Never Used   Substance Use Topics   • Alcohol use: No     Frequency: Never   • Drug use: No     Medications Prior to Admission   Medication Sig Dispense Refill Last Dose   • meloxicam (MOBIC) 7.5 MG tablet Take 7.5 mg by mouth Daily.   Unknown at Unknown time   • nitroglycerin (NITROSTAT) 0.4 MG SL tablet Place 0.4 mg under the tongue Every 5 (Five) Minutes As Needed for Chest Pain. Take no more than 3 doses in 15 minutes.   Unknown at Unknown time     Allergies:  No Known Allergies    Objective    Objective     Vital Signs  Temp:  [97.7 °F (36.5 °C)-98.5 °F (36.9 °C)] 97.7 °F (36.5 °C)  Heart Rate:  [56-71]  62  Resp:  [17-18] 17  BP: (116-145)/(66-88) 131/77  SpO2:  [93 %-97 %] 95 %  on   ;   Device (Oxygen Therapy): room air  Body mass index is 25.05 kg/m².    Physical Exam   Constitutional: He appears well-developed and well-nourished. No distress.   HENT:   Head: Normocephalic and atraumatic.   Mouth/Throat: Oropharynx is clear and moist.   Eyes: Conjunctivae and EOM are normal. No scleral icterus.   Neck: Normal range of motion. No JVD present. No tracheal deviation present.   Cardiovascular: Normal rate, regular rhythm, normal heart sounds and intact distal pulses.   Pulmonary/Chest: Effort normal and breath sounds normal. No respiratory distress.   Abdominal: Soft. Bowel sounds are normal. He exhibits no distension and no mass. There is no tenderness. There is no rebound and no guarding.   Musculoskeletal: Normal range of motion. He exhibits no edema or tenderness.   Neurological: He is alert. No cranial nerve deficit.   Oriented to person, home [facility].  Confused to place, situation, date. He is not sure how he arrived to [facility].    Skin: Skin is warm and dry.   Psychiatric: He has a normal mood and affect. His speech is normal and behavior is normal. Cognition and memory are impaired.   Nursing note and vitals reviewed.      Results Review:  I reviewed the patient's new clinical results.  I reviewed the patient's new imaging results and agree with the interpretation.  I reviewed the patient's other test results and agree with the interpretation          Lab Results (last 24 hours)     Procedure Component Value Units Date/Time    CBC & Differential [[id number]] Collected:  06/20/19 0333    Specimen:  Blood Updated:  06/20/19 0350    Narrative:       The following orders were created for panel order CBC & Differential.  Procedure                               Abnormality         Status                     ---------                               -----------         ------                      CBC Auto Differential[[id number]]        Abnormal            Final result                 Please view results for these tests on the individual orders.    Comprehensive Metabolic Panel [[id number]]  (Abnormal) Collected:  06/20/19 0333    Specimen:  Blood Updated:  06/20/19 0409     Glucose 101 mg/dL      BUN 13 mg/dL      Creatinine 0.90 mg/dL      Sodium 142 mmol/L      Potassium 3.7 mmol/L      Chloride 105 mmol/L      CO2 27.0 mmol/L      Calcium 9.5 mg/dL      Total Protein 6.8 g/dL      Albumin 4.00 g/dL      ALT (SGPT) 8 U/L      AST (SGOT) 15 U/L      Alkaline Phosphatase 57 U/L      Total Bilirubin 0.6 mg/dL      eGFR Non African Amer 81 mL/min/1.73      Globulin 2.8 gm/dL      A/G Ratio 1.4 g/dL      BUN/Creatinine Ratio 14.4     Anion Gap 10.0 mmol/L     Narrative:       GFR Normal >60  Chronic Kidney Disease <60  Kidney Failure <15    CBC Auto Differential [[id number]]  (Abnormal) Collected:  06/20/19 0333    Specimen:  Blood Updated:  06/20/19 0350     WBC 8.21 10*3/mm3      RBC 4.90 10*6/mm3      Hemoglobin 13.9 g/dL      Hematocrit 43.7 %      MCV 89.2 fL      MCH 28.4 pg      MCHC 31.8 g/dL      RDW 14.3 %      RDW-SD 46.3 fl      MPV 9.8 fL      Platelets 261 10*3/mm3      Neutrophil % 63.1 %      Lymphocyte % 18.8 %      Monocyte % 9.7 %      Eosinophil % 6.9 %      Basophil % 1.0 %      Immature Grans % 0.5 %      Neutrophils, Absolute 5.18 10*3/mm3      Lymphocytes, Absolute 1.54 10*3/mm3      Monocytes, Absolute 0.80 10*3/mm3      Eosinophils, Absolute 0.57 10*3/mm3      Basophils, Absolute 0.08 10*3/mm3      Immature Grans, Absolute 0.04 10*3/mm3      nRBC 0.0 /100 WBC     Urinalysis With Microscopic If Indicated (No Culture) - Urine, Clean Catch [[id number]]  (Normal) Collected:  06/20/19 0416    Specimen:  Urine, Clean Catch Updated:  06/20/19 0427     Color, UA Yellow     Appearance, UA Clear     pH, UA 6.0     Specific Gravity, UA 1.020     Glucose, UA Negative     Ketones, UA Negative      Bilirubin, UA Negative     Blood, UA Negative     Protein, UA Negative     Leuk Esterase, UA Negative     Nitrite, UA Negative     Urobilinogen, UA 1.0 E.U./dL    Narrative:       Urine microscopic not indicated.    Urine Drug Screen - Urine, Clean Catch [[id number]]  (Normal) Collected:  06/20/19 0416    Specimen:  Urine, Clean Catch Updated:  06/20/19 0624     Amphet/Methamphet, Screen Negative     Barbiturates Screen, Urine Negative     Benzodiazepine Screen, Urine Negative     Cocaine Screen, Urine Negative     Opiate Screen Negative     THC, Screen, Urine Negative     Methadone Screen, Urine Negative     Oxycodone Screen, Urine Negative    Narrative:       Negative Thresholds For Drugs Screened:     Amphetamines               500 ng/ml   Barbiturates               200 ng/ml   Benzodiazepines            100 ng/ml   Cocaine                    300 ng/ml   Methadone                  300 ng/ml   Opiates                    300 ng/ml   Oxycodone                  100 ng/ml   THC                        50 ng/ml    The Normal Value for all drugs tested is negative. This report includes final unconfirmed screening results to be used for medical treatment purposes only. Unconfirmed results must not be used for non-medical purposes such as employment or legal testing. Clinical consideration should be applied to any drug of abuse test, particulary when unconfirmed results are used.    Ethanol [[id number]] Collected:  06/20/19 0801    Specimen:  Blood Updated:  06/20/19 0848     Ethanol <10 mg/dL      Ethanol % <0.010 %     TSH [[id number]] Collected:  06/20/19 0801    Specimen:  Blood Updated:  06/20/19 0917          Imaging Results (last 24 hours)     Procedure Component Value Units Date/Time    CT Head Without Contrast [[id number]] Collected:  06/20/19 0319     Updated:  06/20/19 0341    Narrative:       CT OF THE HEAD WITHOUT CONTRAST     HISTORY: Altered mental status     COMPARISON: None available.     TECHNIQUE: Axial CT  imaging was obtained from the vertex of the skull  and skull base. No IV contrast was administered.     FINDINGS:  No acute intracranial hemorrhage is identified. There is diffuse  atrophy. There is periventricular deep white matter microangiopathic  disease. There is no midline shift or mass effect. Mucosal thickening is  identified within the ethmoid sinuses. There is near complete  opacification of the right maxillary sinus. There are calcifications in  hyperattenuation material identified within this debris, suggesting that  it is chronic. Mild mucosal thickening seen within the left maxillary  sinus. Mucous retention cyst is seen within the right sphenoid sinus.  Right mastoid air cells also appear relatively underpneumatized, which  may reflect changes of chronic mastoiditis/otitis media.       Impression:          1. No acute intracranial process identified.  2. Extensive sinus inflammatory changes.     Radiation dose reduction techniques were utilized, including automated  exposure control and exposure modulation based on body size.     This report was finalized on 6/20/2019 3:37 AM by Dr. Marium Haq M.D.                  No orders to display        Assessment/Plan     Active Hospital Problems    Diagnosis POA   • **Confusion and disorientation [F99] Yes   • Arthritis [M19.90] Yes   • Hiatal hernia [K44.9] Yes       Mr. Preciado is a 78 y.o. that we know is from [place] and was traveling to [place] to visit his brother, Ronaldo Preciado.  No other known family.  His cognitive changes are concerning for dementia but will rule out other etiologies of his acute confusion.  Nursing staff is continuing to attempt to speak with family.   Consult neurology  For additional brain imaging to neuro  Ethanal and toxicology screen negative  Check B12, folate, TSH, ammonia  PT OT consult  Bedside nursing swallow evaluation and if fails consult SLP.    Regular diet if he passes swallow evaluation  Falls precautions  He  has several medications listed on his documentation from primary care provider but patient states he does not take any at this time. None of meds listed are critical medications.     · I discussed the patients findings and my recommendations with patient and nursing staff. Will attempt to contact brother.     VTE Prophylaxis - SCDs .  Code Status - Full code.       CAITY Camp  [facility]  06/20/19  9:18 AM      Brief Attending Admission Attestation     I have seen and examined the patient, performing an independent face-to-face diagnostic evaluation with plan of care reviewed and developed with the advanced practice registered nurse (APRN).      Brief Summary Statement/HPI  Mr. Preciado is a 78 y.o. male who is admitted after being brought in by the police for sitting in a parked car in the middle of the road.  He is evidently from [place] and has been trying to find his way to [place] the last few days.  Family in [place].  During my exam he is very pleasant but confused.  Remainder of detailed HPI is as noted above and has been reviewed and/or edited by me for completeness.    Attending Physical Exam  Temp:  [97.7 °F (36.5 °C)-98.5 °F (36.9 °C)] 97.9 °F (36.6 °C)  Heart Rate:  [56-75] 75  Resp:  [17-18] 18  BP: (116-145)/(66-88) 126/80  Constitutional: Well-developed, cooperative, no distress.   Neck: No JVD  Respiratory: Clear to auscultation bilaterally  Cardiovascular: RRR, no murmur  Abdominal: Soft, nontender, nondistended.  + BS  Musculoskeletal: No edema.   Neurological: Oriented x1 only    Assessment/Plan  Thus far work-up unremarkable.  Head CT negative and blood works essentially normal.  Likely has significant underlying dementia.  Neurology to see.  Will need discharge planning.  See above section for further detailed assessment and plan developed with APRN which I have reviewed and/or edited.      Electronically signed by Arsenio Yao MD, 6/20/2019, 2:07 PM.

## 2019-06-20 NOTE — NURSING NOTE
I googled hospitals in the home town of this gentleman and results showed [facility] in [place], [place]. [phone]. Called and s/w the ED who gave me the name of the patient's PCP, Dr. Naldo Gaytan [phone]. Call placed. Awaiting return call.     Also called and left a message for the neighbor, Mary, [phone].

## 2019-06-20 NOTE — PLAN OF CARE
Problem: Patient Care Overview  Goal: Plan of Care Review   06/20/19 5633   OTHER   Outcome Summary Patient to 5 park from the ED. Patient is disoriented to place, time, situation. Patient is not able to tell me who his MD is, or if he takes any medications. RN awaiting fax from PCP with last office notes for further evaluation. Patient's vitals are stable; no acute distress, resting comfortably in bed. RN to continue to monitor.

## 2019-06-20 NOTE — ED TRIAGE NOTES
"EMS states called by PD for a \"mental eval.\" EMS states \"pt thinks he's in [place]\" but states pt is from [facility]. EMS states their stroke scale was negative. EMS states pt possibly on antibiotics for a UTI. Pt oriented to self only. Pt states he is in \"[place].\" No slurred speech, facial droop noted at this time.  "

## 2019-06-20 NOTE — THERAPY DISCHARGE NOTE
Acute Care - Physical Therapy Initial Eval/Discharge  [facility]     Patient Name: Juan Ramon Preciado  : 1940  MRN: [id number]  Today's Date: 2019   Onset of Illness/Injury or Date of Surgery: 19            Admit Date: 2019    Visit Dx:    ICD-10-CM ICD-9-CM   1. Confusion R41.0 298.9     Patient Active Problem List   Diagnosis   • Arthritis   • Confusion and disorientation   • Hiatal hernia   • OBS (organic brain syndrome)   • Vascular dementia     Past Medical History:   Diagnosis Date   • Arthritis    • Confusion and disorientation 2019   • Hiatal hernia 2001     History reviewed. No pertinent surgical history.       PT ASSESSMENT (last 12 hours)      Physical Therapy Evaluation     Row Name 19 1520          PT Evaluation Time/Intention    Subjective Information  no complaints  -     Document Type  discharge evaluation/summary  -     Mode of Treatment  physical therapy  -     Patient Effort  good  -     Symptoms Noted During/After Treatment  none  -     Row Name 19 1520          General Information    Onset of Illness/Injury or Date of Surgery  19  -     Patient Observations  alert;cooperative;agree to therapy  -     Patient/Family Observations  pt supine in bed, no acute distress noted at rest  -     Prior Level of Function  independent:;gait;transfer;bed mobility;ADL's  -     Equipment Currently Used at Home  none  -     Pertinent History of Current Functional Problem  pt admitted with confusion after being found parked in car in middle of road by police  -     Limitations/Impairments  safety/cognitive  -     Barriers to Rehab  cognitive status  -     Row Name 19 1520          Relationship/Environment    Lives With  alone  -     Row Name 19 1520          Resource/Environmental Concerns    Current Living Arrangements  home/apartment/condo  -     Row Name 19 1520          Cognitive Assessment/Interventions    Additional  Documentation  Cognitive Assessment/Intervention (Group)  -     Row Name 06/20/19 1520          Cognitive Assessment/Intervention- PT/OT    Orientation Status (Cognition)  oriented to;person pt answers some appropriately but then goes on tangent  -     Follows Commands (Cognition)  follows one step commands;75-90% accuracy  -     Personal Safety Interventions  fall prevention program maintained;gait belt;nonskid shoes/slippers when out of bed  -     Row Name 06/20/19 1520          Bed Mobility Assessment/Treatment    Bed Mobility Assessment/Treatment  supine-sit;sit-supine  -     Supine-Sit [place] (Bed Mobility)  independent  -     Sit-Supine [place] (Bed Mobility)  independent  -     Row Name 06/20/19 1520          Transfer Assessment/Treatment    Transfer Assessment/Treatment  sit-stand transfer;stand-sit transfer  -     Sit-Stand [place] (Transfers)  supervision  -     Stand-Sit [place] (Transfers)  supervision  -[facility] Name 06/20/19 1520          Gait/Stairs Assessment/Training    [place] Level (Gait)  supervision  -     Assistive Device (Gait)  -- no AD  -     Distance in Feet (Gait)  20 bed to stretcher  -     Deviations/Abnormal Patterns (Gait)  domi decreased;gait speed decreased;stride length decreased  -     Row Name 06/20/19 1520          General ROM    GENERAL ROM COMMENTS  AROM WFL for age  -     Row Name 06/20/19 1520          MMT (Manual Muscle Testing)    General MMT Comments  strength appears functional, no unilateral or focal weakness noted with mobility or gait  -[facility] Name 06/20/19 1520          Motor Assessment/Intervention    Additional Documentation  Balance (Group)  -     Row Name 06/20/19 1520          Balance    Balance  static standing balance;dynamic standing balance  -[facility] Name 06/20/19 1520          Static Standing Balance    Level of [place] (Supported Standing, Static Balance)  supervision  -[facility] Name  06/20/19 1520          Dynamic Standing Balance    Level of [place], Reaches Outside Midline (Standing, Dynamic Balance)  supervision  -[facility] Name 06/20/19 1520          Pain Assessment    Additional Documentation  Pain Scale: Numbers Pre/Post-Treatment (Group)  -[facility] Name 06/20/19 1520          Pain Scale: Numbers Pre/Post-Treatment    Pain Scale: Numbers, Pretreatment  0/10 - no pain  -[facility] Name 06/20/19 1520          Plan of Care Review    Plan of Care Reviewed With  patient  -[facility] Name 06/20/19 1520          Physical Therapy Clinical Impression    Criteria for Skilled Interventions Met (PT Clinical Impression)  no problems identified which require skilled intervention;current level of function same as previous level of function  -[facility] Name 06/20/19 1520          Positioning and Restraints    Pre-Treatment Position  in bed  -     Post Treatment Position  other  -     Other Position  with other staff on stretcher with transporter present to take pt for testing  -       User Key  (r) = Recorded By, (t) = Taken By, (c) = Cosigned By    Initials Name Provider Type     Alexia Francois, PT Physical Therapist              PT Recommendation and Plan  Anticipated Discharge Disposition (PT): home with assist  Therapy Frequency (PT Clinical Impression): evaluation only  Outcome Summary/Treatment Plan (PT)  Anticipated Discharge Disposition (PT): home with assist  Plan of Care Reviewed With: patient  Outcome Summary: Pt with confusion and tangetal conversation. However, strength and balance appear to be near baseline as pt did not require assistance with bed mobility, transfers, or gait. PT will sign off at this time. RN aware. Please reconsult PT, if patient should experience a change in mobility.    Outcome Measures     [facility] Name 06/20/19 1500             How much help from another person do you currently need...    Turning from your back to your side while in flat bed without  using bedrails?  4  -CH      Moving from lying on back to sitting on the side of a flat bed without bedrails?  4  -CH      Moving to and from a bed to a chair (including a wheelchair)?  4  -CH      Standing up from a chair using your arms (e.g., wheelchair, bedside chair)?  4  -CH      Climbing 3-5 steps with a railing?  3  -CH      To walk in hospital room?  4  -CH      AM-PAC 6 Clicks Score  23  -CH         Functional Assessment    Outcome Measure Options  AM-PAC 6 Clicks Basic Mobility (PT)  -        User Key  (r) = Recorded By, (t) = Taken By, (c) = Cosigned By    Initials Name Provider Type    Alexia Art, DAVON Physical Therapist           Time Calculation:   PT Charges     Row Name 06/20/19 1541             Time Calculation    Start Time  1512  -      Stop Time  1520  -      Time Calculation (min)  8 min  -CH      PT Received On  06/20/19  -        User Key  (r) = Recorded By, (t) = Taken By, (c) = Cosigned By    Initials Name Provider Type    Alexia Art, PT Physical Therapist        Therapy Charges for Today     Code Description Service Date Service Provider Modifiers Qty    [id number] HC PT EVAL LOW COMPLEXITY 1 6/20/2019 Alexia Francois, PT GP 1          PT G-Codes  Outcome Measure Options: AM-PAC 6 Clicks Basic Mobility (PT)  AM-PAC 6 Clicks Score: 23    PT Discharge Summary  Anticipated Discharge Disposition (PT): home with assist    Alexia Francois, PT  6/20/2019

## 2019-06-21 PROCEDURE — G0378 HOSPITAL OBSERVATION PER HR: HCPCS

## 2019-06-21 PROCEDURE — 97535 SELF CARE MNGMENT TRAINING: CPT

## 2019-06-21 PROCEDURE — 97165 OT EVAL LOW COMPLEX 30 MIN: CPT

## 2019-06-21 PROCEDURE — 99224 PR SBSQ OBSERVATION CARE/DAY 15 MINUTES: CPT | Performed by: PSYCHIATRY & NEUROLOGY

## 2019-06-21 RX ADMIN — SODIUM CHLORIDE, PRESERVATIVE FREE 3 ML: 5 INJECTION INTRAVENOUS at 08:07

## 2019-06-21 NOTE — CONSULTS
Patient is hard of hearing and has no hearing aid. I will come back on Monday after his brother brings it to him hopefully soon. The Nurse's Aid explained to me the history of the patient.    Chaplain Ng

## 2019-06-21 NOTE — PROGRESS NOTES
Continued Stay Note  [facility]     Patient Name: Juan Ramon Preciado  MRN: [id number]  Today's Date: 6/21/2019    Admit Date: 6/20/2019    Discharge Plan     Row Name 06/21/19 1426       Plan    Plan  Home with Brother (Ronaldo) and Nephew (Juan) in [place]    Patient/Family in Agreement with Plan  yes    Plan Comments  Spoke with nephew/POA Juan Preciado via phone. Juan stated he and patient's brother, Ronaldo will be leaving [place] at 5am to come to [place]. Plan is to bring patient back to [place]. Reviewed with Juan JUDGE and copy left at bedside. Juan Preciado's contact numbers are: [phone] (c) and [phone] (w). Annie ([facility]) notified of plan.         Conchita Kapoor RN

## 2019-06-21 NOTE — PLAN OF CARE
Problem: Fall Risk (Adult)  Goal: Identify Related Risk Factors and Signs and Symptoms  Outcome: Ongoing (interventions implemented as appropriate)    Goal: Absence of Fall  Outcome: Ongoing (interventions implemented as appropriate)      Problem: Confusion, Acute (Adult)  Goal: Cognitive/Functional Impairments Minimized  Outcome: Ongoing (interventions implemented as appropriate)    Goal: Safety  Outcome: Ongoing (interventions implemented as appropriate)      Problem: Patient Care Overview  Goal: Plan of Care Review  Outcome: Ongoing (interventions implemented as appropriate)   06/21/19 0154   OTHER   Outcome Summary Patient alert. Disoriented to place. Once I reminded him he was in [place], he was able to remember that he is in [facility]. Patient ambulated w/ SBA for safety. Patient completed MRI which showed nothing acute. VSS. Call button within reach.   Coping/Psychosocial   Plan of Care Reviewed With patient   Plan of Care Review   Progress no change

## 2019-06-21 NOTE — PLAN OF CARE
Problem: Patient Care Overview  Goal: Plan of Care Review  Outcome: Ongoing (interventions implemented as appropriate)   06/21/19 5162   OTHER   Outcome Summary pt alert, pleasant and confused. up in chair most of day. stand by assist. impulsive. vss. Hearing aids in place, very [place]. family, POA, [facility] [place] aware for d/c. Wallet in security. Will continue to monitor.   Coping/Psychosocial   Plan of Care Reviewed With patient   Plan of Care Review   Progress no change

## 2019-06-21 NOTE — PROGRESS NOTES
Name: Juan Ramon Preciado ADMIT: 2019   : 1940  PCP: System, Provider Not In    MRN: [id number] LOS: 0 days   AGE/SEX: 78 y.o. male  ROOM: [facility]/1     Subjective   Subjective   Still confused.  Pleasant.  Impaired memory  No complaints.  Denies chest pain, shortness of breath, abdominal pain, nausea, vomiting, tremors, seizure-like activity or fever and chills     Objective   Objective   Vital Signs  Temp:  [97 °F (36.1 °C)-98.6 °F (37 °C)] 98.6 °F (37 °C)  Heart Rate:  [65-79] 67  Resp:  [16-19] 19  BP: (105-126)/(59-80) 109/63  SpO2:  [94 %-97 %] 96 %  on  Flow (L/min):  [2] 2;   Device (Oxygen Therapy): room air  Body mass index is 25.05 kg/m².  Physical Exam   Constitutional: He is oriented to person, place, and time. He appears well-developed and well-nourished. No distress.   HENT:   Head: Normocephalic and atraumatic.   Cardiovascular: Normal rate and regular rhythm.   Pulmonary/Chest: Effort normal and breath sounds normal.   Abdominal: Soft. Bowel sounds are normal. He exhibits no distension and no mass. There is no tenderness. No hernia.   Neurological: He is alert and oriented to person, place, and time. No cranial nerve deficit.   Skin: Skin is warm and dry.   Psychiatric: He has a normal mood and affect. His behavior is normal. Judgment and thought content normal.   Vitals reviewed.      Results Review:       I reviewed the patient's new clinical results.  Results from last 7 days   Lab Units 19  0333   WBC 10*3/mm3 8.21   HEMOGLOBIN g/dL 13.9   PLATELETS 10*3/mm3 261     Results from last 7 days   Lab Units 19  0333   SODIUM mmol/L 142   POTASSIUM mmol/L 3.7   CHLORIDE mmol/L 105   CO2 mmol/L 27.0   BUN mg/dL 13   CREATININE mg/dL 0.90   GLUCOSE mg/dL 101*   Estimated Creatinine Clearance: 66.3 mL/min (by C-G formula based on SCr of 0.9 mg/dL).  Results from last 7 days   Lab Units 19  0333   ALBUMIN g/dL 4.00   BILIRUBIN mg/dL 0.6   ALK PHOS U/L 57   AST (SGOT) U/L 15   ALT  (SGPT) U/L 8     Results from last 7 days   Lab Units 06/20/19  0333   CALCIUM mg/dL 9.5   ALBUMIN g/dL 4.00       No results found for: HGBA1C, POCGLU  Ct Head Without Contrast    Result Date: 6/20/2019   1. No acute intracranial process identified. 2. Extensive sinus inflammatory changes.  Radiation dose reduction techniques were utilized, including automated exposure control and exposure modulation based on body size.  This report was finalized on 6/20/2019 3:37 AM by Dr. Marium Haq M.D.      Mri Brain With & Without Contrast    Result Date: 6/20/2019   1.  No findings of acute intracranial abnormality. 2.  Mild-to-moderate chronic ischemic white matter changes. 3.  Partial occlusion of the bilateral ethmoid and right maxillary sinuses which can be seen in sinusitis in the appropriate clinical context and correlation with patient history is recommended.  This report was finalized on 6/20/2019 9:40 PM by Dr. Rich Lee M.D.          sodium chloride 3 mL Intravenous Q12H      Diet Regular       Assessment/Plan     Active Hospital Problems    Diagnosis  POA   • **Vascular dementia [F01.50]  Yes   • Arthritis [M19.90]  Yes   • Confusion and disorientation [F99]  Yes   • OBS (organic brain syndrome) [F09]  Yes   • Hiatal hernia [K44.9]  Yes      Resolved Hospital Problems   No resolved problems to display.   Vascular dementia with acute episode of confusion  MRI with no acute change  EEG pending  Neurology okay with discharge  Patient's family is going to be coming from [facility] tomorrow to pick him up.        VTE Prophylaxis - SCDs   Code Status - Full code  Disposition - Anticipate discharge tomorrow to return home with family.      CAITY Camp  [facility]  06/21/19  11:44 AM        Brief Attending Progress Note     I have seen and examined the patient, performing an independent face-to-face diagnostic evaluation with plan of care reviewed and developed with  the advanced practice registered nurse (APRN).       Brief Summary Statement/HPI   Feels fine and wonders why he is still here.    Attending Physical Exam  Temp:  [97 °F (36.1 °C)-98.6 °F (37 °C)] 98 °F (36.7 °C)  Heart Rate:  [65-79] 65  Resp:  [16-19] 19  BP: (105-125)/(59-79) 125/79  Constitutional: Well-developed, cooperative, no acute distress.   Respiratory: Clear to auscultation bilaterally  Cardiovascular: RRR, no murmur  Abdominal: Soft, nontender, nondistended.  + BS  Musculoskeletal: No edema.     Assessment/Plan  Work-up seems most consistent with dementia.  Family to come pick him up tomorrow.  For additional inSee above section for further detailed assessment and plan developed with APRN which I have reviewed and/or edited.      Electronically signed by Arsenio Yao MD, 6/21/2019, 5:26 PM.

## 2019-06-21 NOTE — PROGRESS NOTES
Patient Identification:  NAME:  Juan Ramon Preciado  Age:  78 y.o.   Sex:  male   :  1940   MRN:  [id number]       Chief complaint: Vascular dementia confusion    History of present illness: He is doing very well he is awake alert has no complaints but is not wearing a hearing aid and between his dementia and lack of hearing it is not a very good worthwhile interaction today.  Nonetheless he is very awake and alert and I told him of the MRI and EEG are both normal      Past medical history:  Past Medical History:   Diagnosis Date   • Arthritis    • Confusion and disorientation 2019   • Hiatal hernia 2001       Allergies:  Patient has no known allergies.    Home medications:  Medications Prior to Admission   Medication Sig Dispense Refill Last Dose   • meloxicam (MOBIC) 7.5 MG tablet Take 7.5 mg by mouth Daily.   Unknown at Unknown time   • nitroglycerin (NITROSTAT) 0.4 MG SL tablet Place 0.4 mg under the tongue Every 5 (Five) Minutes As Needed for Chest Pain. Take no more than 3 doses in 15 minutes.   Unknown at Unknown time        Hospital medications:    sodium chloride 3 mL Intravenous Q12H        •  acetaminophen  •  ondansetron **OR** ondansetron  •  sodium chloride      Objective:  Vitals Ranges:   Temp:  [97 °F (36.1 °C)-98.6 °F (37 °C)] 98 °F (36.7 °C)  Heart Rate:  [65-79] 65  Resp:  [16-19] 19  BP: (105-125)/(59-79) 125/79      Physical Exam:  Very hard of hearing but is able to comprehend name and repeat normal cranial nerves II through VII fair motor x4 extremities reflexes trace throughout symmetrical toes downgoing bilaterally    Results review:   I reviewed the patient's new clinical results.    Data review:  Lab Results (last 24 hours)     ** No results found for the last 24 hours. **           Imaging:  Imaging Results (last 24 hours)     Procedure Component Value Units Date/Time    MRI Brain With & Without Contrast [[id number]] Collected:  19     Updated:  19     Narrative:       MRI BRAIN WITH AND WITHOUT CONTRAST     HISTORY:    Dementia, vascular etiology suspected     COMPARISON: CT head 06/20/2019     TECHNIQUE: Multiplanar, multisequence MR imaging of the brain was  performed with and without intravenous contrast.     FINDINGS:     There is no restricted diffusion. T2 hyperintensity within the  periventricular and subcortical white matter likely represents  mild-to-moderate chronic ischemic small vessel degenerative changes.  There is no mass effect, midline shift, hydrocephalus, parenchymal  hemorrhage, or abnormal extra-axial fluid collection. The major T2  intracranial arterial flow voids appear grossly normal. Midline  structures are unremarkable.     There is no abnormal intracranial enhancement. 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     Partial occlusion of the bilateral ethmoid and right maxillary sinuses  is present.       Impression:          1.  No findings of acute intracranial abnormality.  2.  Mild-to-moderate chronic ischemic white matter changes.  3.  Partial occlusion of the bilateral ethmoid and right maxillary  sinuses which can be seen in sinusitis in the appropriate clinical  context and correlation with patient history is recommended.     This report was finalized on 6/20/2019 9:40 PM by Dr. Rich Lee M.D.                Assessment and Plan:       Vascular dementia    Arthritis    Confusion and disorientation    Hiatal hernia    OBS (organic brain syndrome)  CT of the head normal, MRI of the brain within normal limits not showing any stroke EEG also normal.  At this point I can certainly say that this patient has dementia, and is very hard of hearing, but I do not have any other definite diagnosis that I can give him.  I would definitely recommend no more driving and have him discharged as he wants but needs to follow-up with outpatient primary MD certainly thank you      Walter HALEY  MD [place]  06/21/19  2:29 PM

## 2019-06-21 NOTE — PLAN OF CARE
Problem: Patient Care Overview  Goal: Plan of Care Review   06/21/19 1106   OTHER   Outcome Summary Pt presents to OT alert and cooperative for functional tasks. Pt does require cues to stay on task for adl task. Pt ambulates to bathroom and performs grooming task standing at sink with SBA and vcs. Pt performs LE dress with SBA sitting . Will not follow for further acute OT but would recommend SBA for safety .    Coping/Psychosocial   Plan of Care Reviewed With patient

## 2019-06-21 NOTE — THERAPY DISCHARGE NOTE
Acute Care - Occupational Therapy Initial Eval/Discharge  [facility]     Patient Name: Juan Ramon Preciado  : 1940  MRN: [id number]  Today's Date: 2019  Onset of Illness/Injury or Date of Surgery: 19            Admit Date: 2019       ICD-10-CM ICD-9-CM   1. Confusion R41.0 298.9     Patient Active Problem List   Diagnosis   • Arthritis   • Confusion and disorientation   • Hiatal hernia   • OBS (organic brain syndrome)   • Vascular dementia     Past Medical History:   Diagnosis Date   • Arthritis    • Confusion and disorientation 2019   • Hiatal hernia 2001     History reviewed. No pertinent surgical history.       OT ASSESSMENT FLOWSHEET (last 12 hours)      Occupational Therapy Evaluation     Row Name 19 1100                   OT Evaluation Time/Intention    Subjective Information  no complaints  -SG        Document Type  evaluation;discharge evaluation/summary  -SG        Mode of Treatment  individual therapy;occupational therapy  -SG        Patient Effort  good  -SG           General Information    Patient Profile Reviewed?  yes  -SG        Patient Observations  alert;cooperative;agree to therapy  -SG        General Observations of Patient  sitting in chair  -SG        Prior Level of Function  independent:;ADL's  -SG           Cognitive Assessment/Intervention- PT/OT    Orientation Status (Cognition)  oriented to;person;place  -SG        Follows Commands (Cognition)  WFL  -SG           Functional Mobility    Functional Mobility- Ind. Level  supervision required  -SG        Functional Mobility- Comment  ambulates to bathroom and in room and then back to chair  -SG           Transfer Assessment/Treatment    Transfer Assessment/Treatment  sit-stand transfer;stand-sit transfer;toilet transfer  -SG           Sit-Stand Transfer    Sit-Stand Power (Transfers)  supervision  -SG           Stand-Sit Transfer    Stand-Sit Power (Transfers)  supervision  -SG           ADL  Assessment/Intervention    BADL Assessment/Intervention  bathing;grooming;lower body dressing  -SG           Lower Body Dressing Assessment/Training    Lower Body Dressing [place] Level  lower body dressing skills;doff;don;supervision  -SG        Lower Body Dressing Position  supported sitting  -SG           Grooming Assessment/Training    [place] Level (Grooming)  grooming skills;wash face, hands;supervision cleans glasses standing at sink in bathroom   -SG        Grooming Position  supported standing  -SG        Comment (Grooming)  standing at sink in bathroom for all task  -SG           BADL Safety/Performance    Impairments, BADL Safety/Performance  cognition  -SG        Skilled BADL Treatment/Intervention  BADL process/adaptation training  -SG           General ROM    GENERAL ROM COMMENTS  BUE's WFL AROM  -SG           Static Standing Balance    Level of [place] (Supported Standing, Static Balance)  supervision  -SG           Dynamic Standing Balance    Level of [place], Reaches Outside Midline (Standing, Dynamic Balance)  supervision  -SG           Sensory Assessment/Intervention    Sensory General Assessment  no sensation deficits identified BUE's  -SG           Positioning and Restraints    Pre-Treatment Position  sitting in chair/recliner  -SG        Post Treatment Position  chair  -SG        In Chair  sitting;call light within reach;encouraged to call for assist;exit alarm on  -SG           Pain Scale: Numbers Pre/Post-Treatment    Pain Scale: Numbers, Pretreatment  0/10 - no pain  -SG           Clinical Impression (OT)    OT Diagnosis  need for assist with personal care  -SG        Care Plan Review (OT)  evaluation/treatment results reviewed  -SG           Patient Education Goal (OT)    Activity (Patient Education Goal, OT)  pt to demonstrate SBA with adl task  -SG        [place]/Cues/Accuracy (Memory Goal 2, OT)  demonstrates adequately  -SG        Time Frame (Patient Education  Goal, OT)  1 day  -SG        Progress/Outcome (Patient Education Goal, OT)  goal met  -SG          User Key  (r) = Recorded By, (t) = Taken By, (c) = Cosigned By    Initials Name Effective Dates    Conchita Rene OTR 06/08/18 -           Occupational Therapy Education     Title: PT OT SLP Therapies (Done)     Topic: Occupational Therapy (Done)     Point: ADL training (Done)     Description: Instruct learner(s) on proper safety adaptation and remediation techniques during self care or transfers.   Instruct in proper use of assistive devices.    Learning Progress Summary           Patient Acceptance, E,TB,D, VU by  at 6/21/2019 11:06 AM    Comment:  role of OT and POC                               User Key     Initials Effective Dates Name Provider Type Discipline     06/08/18 -  Conchita Christiansen OTR Occupational Therapist OT                OT Recommendation and Plan     Plan of Care Review  Plan of Care Reviewed With: patient  Plan of Care Reviewed With: patient  Outcome Summary: Pt presents to OT alert and cooperative for functional tasks. Pt does require cues to stay on task for adl task. Pt ambulates to bathroom and performs grooming task standing at sink with SBA and vcs. Pt performs LE dress with SBA sitting .  Will not follow for further acute OT but would recommend SBA for safety .       Rehab Goal Summary     Row Name 06/21/19 1100             Patient Education Goal (OT)    Activity (Patient Education Goal, OT)  pt to demonstrate SBA with adl task  -SG      [place]/Cues/Accuracy (Memory Goal 2, OT)  demonstrates adequately  -SG      Time Frame (Patient Education Goal, OT)  1 day  -SG      Progress/Outcome (Patient Education Goal, OT)  goal met  -SG        User Key  (r) = Recorded By, (t) = Taken By, (c) = Cosigned By    Initials Name Provider Type Discipline    Conchita Rene OTR Occupational Therapist OT          Outcome Measures     Row Name 06/21/19 1110 06/20/19 1500          How  much help from another person do you currently need...    Turning from your back to your side while in flat bed without using bedrails?  --  4  -CH     Moving from lying on back to sitting on the side of a flat bed without bedrails?  --  4  -CH     Moving to and from a bed to a chair (including a wheelchair)?  --  4  -CH     Standing up from a chair using your arms (e.g., wheelchair, bedside chair)?  --  4  -CH     Climbing 3-5 steps with a railing?  --  3  -CH     To walk in hospital room?  --  4  -CH     AM-PAC 6 Clicks Score  --  23  -CH        How much help from another is currently needed...    Putting on and taking off regular lower body clothing?  3  -SG  --     Bathing (including washing, rinsing, and drying)  3  -SG  --     Toileting (which includes using toilet bed pan or urinal)  3  -SG  --     Putting on and taking off regular upper body clothing  3  -SG  --     Taking care of personal grooming (such as brushing teeth)  3  -SG  --     Eating meals  4  -SG  --     Score  19  -SG  --        Functional Assessment    Outcome Measure Options  AM-PAC 6 Clicks Daily Activity (OT)  -  AM-PAC 6 Clicks Basic Mobility (PT)  -       User Key  (r) = Recorded By, (t) = Taken By, (c) = Cosigned By    Initials Name Provider Type    Conchita Rene OTR Occupational Therapist     Alexia Francois, PT Physical Therapist          Time Calculation:   Time Calculation- OT     Row Name 06/21/19 1111             Time Calculation- OT    OT Start Time  0822  -      OT Stop Time  0842  -      OT Time Calculation (min)  20 min  -      Total Timed Code Minutes- OT  11 minute(s)  -      OT Received On  06/21/19  -        User Key  (r) = Recorded By, (t) = Taken By, (c) = Cosigned By    Initials Name Provider Type    Conchita Rene OTR Occupational Therapist        Therapy Suggested Charges     Code   Minutes Charges    None           Therapy Charges for Today     Code Description Service Date Service  Provider Modifiers Qty    76222842376 HC OT EVAL LOW COMPLEXITY 2 6/21/2019 Conchita Christiansen OTR GO 1    93582082686 HC OT SELF CARE/MGMT/TRAIN EA 15 MIN 6/21/2019 Conchita Christiansen OTR GO 1               OT Discharge Summary  Reason for Discharge: other (comment)(pt at SBA level for adl tasks. Recommend SBA for safety)    ARAVIND Morales  6/21/2019

## 2019-06-22 VITALS
BODY MASS INDEX: 24.94 KG/M2 | OXYGEN SATURATION: 98 % | SYSTOLIC BLOOD PRESSURE: 106 MMHG | WEIGHT: 155.2 LBS | HEART RATE: 69 BPM | DIASTOLIC BLOOD PRESSURE: 58 MMHG | HEIGHT: 66 IN | TEMPERATURE: 98.4 F | RESPIRATION RATE: 18 BRPM

## 2019-06-22 PROCEDURE — G0378 HOSPITAL OBSERVATION PER HR: HCPCS

## 2019-06-22 RX ADMIN — SODIUM CHLORIDE, PRESERVATIVE FREE 3 ML: 5 INJECTION INTRAVENOUS at 08:59

## 2019-06-22 NOTE — PLAN OF CARE
Problem: Confusion, Acute (Adult)  Goal: Identify Related Risk Factors and Signs and Symptoms  Outcome: Ongoing (interventions implemented as appropriate)    Goal: Cognitive/Functional Impairments Minimized  Outcome: Ongoing (interventions implemented as appropriate)    Goal: Safety  Outcome: Ongoing (interventions implemented as appropriate)      Problem: Patient Care Overview  Goal: Plan of Care Review  Outcome: Ongoing (interventions implemented as appropriate)   06/22/19 8751   OTHER   Outcome Summary No acute changes noted overnight. Patient sleeping in chair w/ chair alarm activated. Pt to be discharged tomorrow.    Coping/Psychosocial   Plan of Care Reviewed With patient   Plan of Care Review   Progress improving

## 2019-06-22 NOTE — DISCHARGE SUMMARY
"    Patient Name: Juan Ramon Preciado  : 1940  MRN: [id number]    Date of Admission: 2019  Date of Discharge:  2019  Primary Care Physician: System, Provider Not In      Chief Complaint:   Altered Mental Status      Discharge Diagnoses     Active Hospital Problems    Diagnosis  POA   • **Vascular dementia [F01.50]  Yes   • Arthritis [M19.90]  Yes   • Confusion and disorientation [F99]  Yes   • OBS (organic brain syndrome) [F09]  Yes   • Hiatal hernia [K44.9]  Yes      Resolved Hospital Problems   No resolved problems to display.        Hospital Course     Mr. Preciado is a 78 y.o. male with a history of probable dementia who presented to [facility] initially complaining of altered mental status.  Please see the admitting history and physical for further details.  He was found by the police parked in the middle of the road.  He evidently was on his way to [place] from [place] to visit family.  He was admitted to the hospital as there is no safe discharge plan in the ED.  He was seen by neurology but there is no evidence of stroke, encephalopathy, infection, seizures etc.  They felt likely he has vascular dementia which I agree.  Family is coming to get him and will stay with him.      Day of Discharge     HPI:   Doing fine and eager for discharge.  No complaints at all.    Physical Exam:  Temp:  [97.5 °F (36.4 °C)-98.6 °F (37 °C)] 98.4 °F (36.9 °C)  Heart Rate:  [] 69  Resp:  [16-22] 18  BP: (106-135)/(58-92) 106/58  Body mass index is 25.05 kg/m².  Physical Exam  /58 (BP Location: Right arm, Patient Position: Sitting)   Pulse 69   Temp 98.4 °F (36.9 °C) (Oral)   Resp 18   Ht 167.6 cm (66\")   Wt 70.4 kg (155 lb 3.2 oz)   SpO2 98%   BMI 25.05 kg/m²     General Appearance:    Alert, cooperative, no distress, appears stated age   Head:    Normocephalic, without obvious abnormality, atraumatic   Lungs:     Clear to auscultation bilaterally, respirations unlabored   Heart:    " Regular rate and rhythm, S1 and S2 normal, no murmur, rub    or gallop   Abdomen:     Soft, non-tender, bowel sounds active all four quadrants,     no masses, no organomegaly   Extremities:   Extremities normal, atraumatic, no cyanosis or edema   Neurologic:   CNII-XII intact. Normal strength, sensation and reflexes       throughout       Consultants     Consult Orders (all) (From admission, onward)    Start     Ordered    06/20/19 0813  Inpatient Case Management  Consult  Once     Provider:  (Not yet assigned)    06/20/19 0813    06/20/19 0736  Inpatient Neurology Consult  Once     Specialty:  Neurology  Provider:  Walter Osorio MD    06/20/19 0736    06/20/19 0506  [facility] (on-call MD unless specified)  Once     Specialty:  Internal Medicine  Provider:  (Not yet assigned)    06/20/19 0505          Procedures     Imaging Results (all)     Procedure Component Value Units Date/Time    MRI Brain With & Without Contrast [[id number]] Collected:  06/20/19 2128     Updated:  06/20/19 2143    Narrative:       MRI BRAIN WITH AND WITHOUT CONTRAST     HISTORY:    Dementia, vascular etiology suspected     COMPARISON: CT head 06/20/2019     TECHNIQUE: Multiplanar, multisequence MR imaging of the brain was  performed with and without intravenous contrast.     FINDINGS:     There is no restricted diffusion. T2 hyperintensity within the  periventricular and subcortical white matter likely represents  mild-to-moderate chronic ischemic small vessel degenerative changes.  There is no mass effect, midline shift, hydrocephalus, parenchymal  hemorrhage, or abnormal extra-axial fluid collection. The major T2  intracranial arterial flow voids appear grossly normal. Midline  structures are unremarkable.     There is no abnormal intracranial enhancement. 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     Partial occlusion of the bilateral ethmoid and right maxillary sinuses  is  present.       Impression:          1.  No findings of acute intracranial abnormality.  2.  Mild-to-moderate chronic ischemic white matter changes.  3.  Partial occlusion of the bilateral ethmoid and right maxillary  sinuses which can be seen in sinusitis in the appropriate clinical  context and correlation with patient history is recommended.     This report was finalized on 6/20/2019 9:40 PM by Dr. Rich Lee M.D.       CT Head Without Contrast [[id number]] Collected:  06/20/19 0319     Updated:  06/20/19 0341    Narrative:       CT OF THE HEAD WITHOUT CONTRAST     HISTORY: Altered mental status     COMPARISON: None available.     TECHNIQUE: Axial CT imaging was obtained from the vertex of the skull  and skull base. No IV contrast was administered.     FINDINGS:  No acute intracranial hemorrhage is identified. There is diffuse  atrophy. There is periventricular deep white matter microangiopathic  disease. There is no midline shift or mass effect. Mucosal thickening is  identified within the ethmoid sinuses. There is near complete  opacification of the right maxillary sinus. There are calcifications in  hyperattenuation material identified within this debris, suggesting that  it is chronic. Mild mucosal thickening seen within the left maxillary  sinus. Mucous retention cyst is seen within the right sphenoid sinus.  Right mastoid air cells also appear relatively underpneumatized, which  may reflect changes of chronic mastoiditis/otitis media.       Impression:          1. No acute intracranial process identified.  2. Extensive sinus inflammatory changes.     Radiation dose reduction techniques were utilized, including automated  exposure control and exposure modulation based on body size.     This report was finalized on 6/20/2019 3:37 AM by Dr. Marium Haq M.D.                     Pertinent Labs     Results from last 7 days   Lab Units 06/20/19  0333   WBC 10*3/mm3 8.21   HEMOGLOBIN g/dL 13.9   PLATELETS  10*3/mm3 261     Results from last 7 days   Lab Units 06/20/19  0333   SODIUM mmol/L 142   POTASSIUM mmol/L 3.7   CHLORIDE mmol/L 105   CO2 mmol/L 27.0   BUN mg/dL 13   CREATININE mg/dL 0.90   GLUCOSE mg/dL 101*   Estimated Creatinine Clearance: 66.3 mL/min (by C-G formula based on SCr of 0.9 mg/dL).  Results from last 7 days   Lab Units 06/20/19  0333   ALBUMIN g/dL 4.00   BILIRUBIN mg/dL 0.6   ALK PHOS U/L 57   AST (SGOT) U/L 15   ALT (SGPT) U/L 8     Results from last 7 days   Lab Units 06/20/19  0333   CALCIUM mg/dL 9.5   ALBUMIN g/dL 4.00     Results from last 7 days   Lab Units 06/20/19  0937   AMMONIA umol/L 12*             Invalid input(s): LDLCALC        Test Results Pending at Discharge   None    Discharge Details        Discharge Medications      Stop These Medications    meloxicam 7.5 MG tablet  Commonly known as:  MOBIC     nitroglycerin 0.4 MG SL tablet  Commonly known as:  NITROSTAT            No Known Allergies      Discharge Disposition:  Home or Self Care    Discharge Diet:  Diet Order   Procedures   • Diet Regular       Discharge Activity:   Activity Instructions     Activity as Tolerated            CODE STATUS:    Code Status and Medical Interventions:   Ordered at: 06/20/19 0523     Code Status:    CPR     Medical Interventions (Level of Support Prior to Arrest):    Full       No future appointments.  Follow-up Information     System, Provider Not In Follow up in 2 week(s).    Contact information:  [facility] [phone]                 Electronically signed by Arsenio Yao MD, 6/22/2019, 9:33 AM

## 2019-06-24 NOTE — PROGRESS NOTES
Case Management Discharge Note    Final Note: Home with family in [place]. AVS reviewed, no additional needs.     Destination      No service has been selected for the patient.      Durable Medical Equipment      No service has been selected for the patient.      Dialysis/Infusion      No service has been selected for the patient.      Home Medical Care      No service has been selected for the patient.      Therapy      No service has been selected for the patient.      Community Resources      No service has been selected for the patient.        Transportation Services  Private: Car    Final Discharge Disposition Code: 01 - home or self-care